# Patient Record
Sex: MALE | Race: ASIAN | NOT HISPANIC OR LATINO | URBAN - METROPOLITAN AREA
[De-identification: names, ages, dates, MRNs, and addresses within clinical notes are randomized per-mention and may not be internally consistent; named-entity substitution may affect disease eponyms.]

---

## 2021-04-28 ENCOUNTER — IMMUNIZATIONS (OUTPATIENT)
Dept: FAMILY MEDICINE CLINIC | Facility: HOSPITAL | Age: 41
End: 2021-04-28

## 2021-04-28 DIAGNOSIS — Z23 ENCOUNTER FOR IMMUNIZATION: Primary | ICD-10-CM

## 2021-04-28 PROCEDURE — 0001A SARS-COV-2 / COVID-19 MRNA VACCINE (PFIZER-BIONTECH) 30 MCG: CPT

## 2021-04-28 PROCEDURE — 91300 SARS-COV-2 / COVID-19 MRNA VACCINE (PFIZER-BIONTECH) 30 MCG: CPT

## 2021-05-19 ENCOUNTER — IMMUNIZATIONS (OUTPATIENT)
Dept: FAMILY MEDICINE CLINIC | Facility: HOSPITAL | Age: 41
End: 2021-05-19

## 2021-05-19 DIAGNOSIS — Z23 ENCOUNTER FOR IMMUNIZATION: Primary | ICD-10-CM

## 2021-05-19 PROCEDURE — 0002A SARS-COV-2 / COVID-19 MRNA VACCINE (PFIZER-BIONTECH) 30 MCG: CPT

## 2021-05-19 PROCEDURE — 91300 SARS-COV-2 / COVID-19 MRNA VACCINE (PFIZER-BIONTECH) 30 MCG: CPT

## 2024-08-02 ENCOUNTER — HOSPITAL ENCOUNTER (EMERGENCY)
Dept: VASCULAR ULTRASOUND | Facility: HOSPITAL | Age: 44
Discharge: HOME | End: 2024-08-02

## 2024-08-02 ENCOUNTER — HOSPITAL ENCOUNTER (EMERGENCY)
Facility: HOSPITAL | Age: 44
Discharge: HOME | End: 2024-08-02
Attending: EMERGENCY MEDICINE

## 2024-08-02 VITALS
DIASTOLIC BLOOD PRESSURE: 80 MMHG | SYSTOLIC BLOOD PRESSURE: 126 MMHG | HEART RATE: 85 BPM | RESPIRATION RATE: 18 BRPM | OXYGEN SATURATION: 96 % | TEMPERATURE: 98.6 F

## 2024-08-02 DIAGNOSIS — R79.89 ELEVATED SERUM CREATININE: ICD-10-CM

## 2024-08-02 DIAGNOSIS — M79.604 RIGHT LEG PAIN: Primary | ICD-10-CM

## 2024-08-02 DIAGNOSIS — D64.9 ANEMIA: ICD-10-CM

## 2024-08-02 LAB
ALBUMIN SERPL BCG-MCNC: 3.9 G/DL (ref 3.5–5)
ALP SERPL-CCNC: 72 U/L (ref 34–104)
ALT SERPL W P-5'-P-CCNC: 18 U/L (ref 7–52)
ANION GAP SERPL CALCULATED.3IONS-SCNC: 15 MMOL/L (ref 4–13)
AST SERPL W P-5'-P-CCNC: 17 U/L (ref 13–39)
BASOPHILS # BLD AUTO: 0.04 THOUSANDS/ÂΜL (ref 0–0.1)
BASOPHILS NFR BLD AUTO: 0 % (ref 0–1)
BILIRUB SERPL-MCNC: 0.36 MG/DL (ref 0.2–1)
BUN SERPL-MCNC: 59 MG/DL (ref 5–25)
CALCIUM SERPL-MCNC: 9.4 MG/DL (ref 8.4–10.2)
CHLORIDE SERPL-SCNC: 95 MMOL/L (ref 96–108)
CO2 SERPL-SCNC: 24 MMOL/L (ref 21–32)
CREAT SERPL-MCNC: 14.91 MG/DL (ref 0.6–1.3)
EOSINOPHIL # BLD AUTO: 0.31 THOUSAND/ÂΜL (ref 0–0.61)
EOSINOPHIL NFR BLD AUTO: 3 % (ref 0–6)
ERYTHROCYTE [DISTWIDTH] IN BLOOD BY AUTOMATED COUNT: 16.7 % (ref 11.6–15.1)
GFR SERPL CREATININE-BSD FRML MDRD: 3 ML/MIN/1.73SQ M
GLUCOSE SERPL-MCNC: 117 MG/DL (ref 65–140)
HCT VFR BLD AUTO: 34.6 % (ref 36.5–49.3)
HGB BLD-MCNC: 10.2 G/DL (ref 12–17)
IMM GRANULOCYTES # BLD AUTO: 0.07 THOUSAND/UL (ref 0–0.2)
IMM GRANULOCYTES NFR BLD AUTO: 1 % (ref 0–2)
LYMPHOCYTES # BLD AUTO: 1.97 THOUSANDS/ÂΜL (ref 0.6–4.47)
LYMPHOCYTES NFR BLD AUTO: 18 % (ref 14–44)
MCH RBC QN AUTO: 29.1 PG (ref 26.8–34.3)
MCHC RBC AUTO-ENTMCNC: 29.5 G/DL (ref 31.4–37.4)
MCV RBC AUTO: 99 FL (ref 82–98)
MONOCYTES # BLD AUTO: 1.16 THOUSAND/ÂΜL (ref 0.17–1.22)
MONOCYTES NFR BLD AUTO: 11 % (ref 4–12)
NEUTROPHILS # BLD AUTO: 7.49 THOUSANDS/ÂΜL (ref 1.85–7.62)
NEUTS SEG NFR BLD AUTO: 67 % (ref 43–75)
NRBC BLD AUTO-RTO: 0 /100 WBCS
PLATELET # BLD AUTO: 165 THOUSANDS/UL (ref 149–390)
PMV BLD AUTO: 13.7 FL (ref 8.9–12.7)
POTASSIUM SERPL-SCNC: 5.3 MMOL/L (ref 3.5–5.3)
PROT SERPL-MCNC: 8.1 G/DL (ref 6.4–8.4)
RBC # BLD AUTO: 3.51 MILLION/UL (ref 3.88–5.62)
SODIUM SERPL-SCNC: 134 MMOL/L (ref 135–147)
WBC # BLD AUTO: 11.04 THOUSAND/UL (ref 4.31–10.16)

## 2024-08-02 PROCEDURE — 99284 EMERGENCY DEPT VISIT MOD MDM: CPT | Performed by: EMERGENCY MEDICINE

## 2024-08-02 PROCEDURE — 80053 COMPREHEN METABOLIC PANEL: CPT

## 2024-08-02 PROCEDURE — 93971 EXTREMITY STUDY: CPT

## 2024-08-02 PROCEDURE — 85025 COMPLETE CBC W/AUTO DIFF WBC: CPT

## 2024-08-02 PROCEDURE — 99284 EMERGENCY DEPT VISIT MOD MDM: CPT

## 2024-08-02 PROCEDURE — 36415 COLL VENOUS BLD VENIPUNCTURE: CPT

## 2024-08-02 PROCEDURE — 93971 EXTREMITY STUDY: CPT | Performed by: SURGERY

## 2024-08-02 RX ORDER — LIDOCAINE 50 MG/G
1 PATCH TOPICAL ONCE
Status: DISCONTINUED | OUTPATIENT
Start: 2024-08-02 | End: 2024-08-02 | Stop reason: HOSPADM

## 2024-08-02 RX ADMIN — LIDOCAINE 5% 1 PATCH: 700 PATCH TOPICAL at 17:33

## 2024-08-02 NOTE — ED ATTENDING ATTESTATION
8/2/2024  I, Babak Pabon MD, saw and evaluated the patient. I have discussed the patient with the resident/non-physician practitioner and agree with the resident's/non-physician practitioner's findings, Plan of Care, and MDM as documented in the resident's/non-physician practitioner's note, except where noted. All available labs and Radiology studies were reviewed.  I was present for key portions of any procedure(s) performed by the resident/non-physician practitioner and I was immediately available to provide assistance.       At this point I agree with the current assessment done in the Emergency Department.  I have conducted an independent evaluation of this patient a history and physical is as follows:    History    Patient is a 44-year-old male, with a history significant for hypertension per my review of the medical record as well as end-stage renal disease/oliguric on peritoneal dialysis, who presents to the ED today for evaluation of a 1 week history of gradual onset, atraumatic, constant, waxing waning course right calf pain.  Patient denies history of similar symptoms, history of VTE, fever, new weakness or numbness, chest pain, dyspnea, abdominal pain, fever, dysuria or polyuria.  Patient took Tylenol, up to 2 pills daily, to remit his symptoms.  Touch/ambulation exacerbate his discomfort.  Patient's wife, present in room and providing collateral history, states that it is a sharp sensation.  There is associated overlying discoloration.    Patient is without other concerns at this time.     ROS  Patient denies: Fever; dysphagia; vision change; chest pain; dyspnea; abdominal pain; polyuria; dysuria;  weakness; numbness; difficulty walking; confusion    Physical Exam    GENERAL APPEARANCE: NAD  NEURO: Patient is speaking clearly in complete sentences.  Patient is answering appropriately and able follow commands.  Patient is moving all four extremities spontaneously.  No facial droop.  Tongue  midline.  HEENT: PERRL, Moist mucous membranes, external ears normal, nose normal  Neck: No cervical adenopathy  CV: RRR. No murmurs, rubs, gallops.  2+ DP pulses bilaterally  LUNGS: Clear to auscultation: No wheezes, stridor, rhonchi, rales  GI: Abdomen non-distended. Soft. Non-tender and without rebound or guarding   : Deferred at this time  MSK: No deformity.  Slight swelling of the right calf when compared to the left.  Tenderness to palpation over the saphenous distribution.  Soft compartments/no pain out of proportion to exam/no abnormal warmth/no crepitus.  Small area of overlying dark discoloration, narrow, over the saphenous area.  Skin: Warm and dry.  No overlying skin defect of the right lower extremity  Capillary refill: <2 seconds    Patient is currently afebrile and hemodynamically stable    Assessment/Plan/MDM  Right leg pain  -This presentation is concerning for: DVT versus superficial thrombophlebitis.  Sprain, strain also considered.  No reason to suspect NSTI, fracture, compartment syndrome, cellulitis, acute limb ischemia based upon history physical exam  -Will investigate with CBC, CMP, lower extremity duplex  -Will manage with analgesia and further based on workup    ED Course  ED Course as of 08/02/24 1819   Fri Aug 02, 2024   1638 Hemoglobin(!): 10.2  Low, no prior to compare   1702 Creatinine(!): 14.91  High, patient has ESRD         Critical Care Time  Procedures

## 2024-08-02 NOTE — ED PROVIDER NOTES
History  Chief Complaint   Patient presents with    Leg Pain     Pt reports right leg pain starting last week. Denies injury     44 y.o. M with hx HTN, T2DM w/ nephropathy and neuropathy on dialysis presenting for right calf pain. Pt reports pain started spontaneously over the past week without trauma or known bug exposure. Pain is worse with ambulation and improves with rest. Taking tylenol without benefit. Reports cough that is improving over the past week but no other cold like symptoms. No recent travel. No hx or fhx of clotting issues. No fever, chest pain, SOB, abdominal pain, or other acute symptoms.        Leg Pain  Associated symptoms: no back pain and no fever        None       Past Medical History:   Diagnosis Date    Hypertension     Renal disorder        No past surgical history on file.    No family history on file.  I have reviewed and agree with the history as documented.    E-Cigarette/Vaping     E-Cigarette/Vaping Substances     Social History     Tobacco Use    Smoking status: Never    Smokeless tobacco: Never        Review of Systems   Constitutional:  Negative for chills and fever.   Eyes:  Negative for pain and visual disturbance.   Respiratory:  Positive for cough. Negative for shortness of breath.    Cardiovascular:  Negative for chest pain and palpitations.   Gastrointestinal:  Negative for abdominal pain and vomiting.   Genitourinary:  Negative for dysuria and hematuria.   Musculoskeletal:  Positive for myalgias. Negative for arthralgias and back pain.   Skin:  Positive for color change. Negative for rash.   All other systems reviewed and are negative.      Physical Exam  ED Triage Vitals [08/02/24 1543]   Temperature Pulse Respirations Blood Pressure SpO2   98.6 °F (37 °C) 85 18 126/80 96 %      Temp Source Heart Rate Source Patient Position - Orthostatic VS BP Location FiO2 (%)   Oral Monitor -- Right arm --      Pain Score       --             Orthostatic Vital Signs  Vitals:    08/02/24  1543   BP: 126/80   Pulse: 85       Physical Exam  Vitals and nursing note reviewed.   Constitutional:       General: He is not in acute distress.     Appearance: He is well-developed.   HENT:      Head: Normocephalic and atraumatic.   Eyes:      Conjunctiva/sclera: Conjunctivae normal.   Cardiovascular:      Rate and Rhythm: Normal rate and regular rhythm.      Pulses: Normal pulses.      Heart sounds: No murmur heard.  Pulmonary:      Effort: Pulmonary effort is normal. No respiratory distress.      Breath sounds: Normal breath sounds.   Abdominal:      Palpations: Abdomen is soft.      Tenderness: There is no abdominal tenderness.   Musculoskeletal:         General: Swelling and tenderness present.      Cervical back: Neck supple.   Skin:     General: Skin is warm and dry.      Capillary Refill: Capillary refill takes less than 2 seconds.      Comments: Swelling along the medial aspect of the calf tracking towards the ankle.  Overlying erythema.  Mild tenderness palpation over the medial aspect of the tibia.  Vascular intact distally.   Neurological:      Mental Status: He is alert.   Psychiatric:         Mood and Affect: Mood normal.         ED Medications  Medications   lidocaine (LIDODERM) 5 % patch 1 patch (1 patch Topical Medication Applied 8/2/24 6312)       Diagnostic Studies  Results Reviewed       Procedure Component Value Units Date/Time    Comprehensive metabolic panel [[id number]]  (Abnormal) Collected: 08/02/24 1628    Lab Status: Final result Specimen: Blood from Arm, Left Updated: 08/02/24 1654     Sodium 134 mmol/L      Potassium 5.3 mmol/L      Chloride 95 mmol/L      CO2 24 mmol/L      ANION GAP 15 mmol/L      BUN 59 mg/dL      Creatinine 14.91 mg/dL      Glucose 117 mg/dL      Calcium 9.4 mg/dL      AST 17 U/L      ALT 18 U/L      Alkaline Phosphatase 72 U/L      Total Protein 8.1 g/dL      Albumin 3.9 g/dL      Total Bilirubin 0.36 mg/dL      eGFR 3 ml/min/1.73sq m     Narrative:      National  Kid[employer] Disease [employer] guidelines for Chronic Kidney Disease (CKD):     Stage 1 with normal or high GFR (GFR > 90 mL/min/1.73 square meters)    Stage 2 Mild CKD (GFR = 60-89 mL/min/1.73 square meters)    Stage 3A Moderate CKD (GFR = 45-59 mL/min/1.73 square meters)    Stage 3B Moderate CKD (GFR = 30-44 mL/min/1.73 square meters)    Stage 4 Severe CKD (GFR = 15-29 mL/min/1.73 square meters)    Stage 5 End Stage CKD (GFR <15 mL/min/1.73 square meters)  Note: GFR calculation is accurate only with a steady state creatinine    CBC and differential [[id number]]  (Abnormal) Collected: 08/02/24 1628    Lab Status: Final result Specimen: Blood from Arm, Left Updated: 08/02/24 1637     WBC 11.04 Thousand/uL      RBC 3.51 Million/uL      Hemoglobin 10.2 g/dL      Hematocrit 34.6 %      MCV 99 fL      MCH 29.1 pg      MCHC 29.5 g/dL      RDW 16.7 %      MPV 13.7 fL      Platelets 165 Thousands/uL      nRBC 0 /100 WBCs      Segmented % 67 %      Immature Grans % 1 %      Lymphocytes % 18 %      Monocytes % 11 %      Eosinophils Relative 3 %      Basophils Relative 0 %      Absolute Neutrophils 7.49 Thousands/µL      Absolute Immature Grans 0.07 Thousand/uL      Absolute Lymphocytes 1.97 Thousands/µL      Absolute Monocytes 1.16 Thousand/µL      Eosinophils Absolute 0.31 Thousand/µL      Basophils Absolute 0.04 Thousands/µL                    VAS lower limb venous duplex study, unilateral/limited    (Results Pending)         Procedures  Procedures      ED Course  ED Course as of 08/02/24 1743   Fri Aug 02, 2024   1610 44 y.o. M with hx HTN, T2DM w/ nephropathy and neuropathy on dialysis presenting for right calf pain. Pt reports pain started spontaneously over the past week without trauma or known bug exposure. Pain is worse with ambulation and improves with rest. Taking tylenol without benefit. Reports cough that is improving over the past week but no other cold like symptoms. No recent travel. No hx or fhx of clotting issues.  No fever, chest pain, SOB, abdominal pain, or other acute symptoms.   1648 Differential at this point include superficial thrombophlebitis, DVT, calf strain, peripheral vascular disease.   1656 CBC, CMP notable for elevated BUN/creatinine. White blood cell count 11.04, hemoglobin 10.2.  No left shift.  No previous labs available for comparison.   1727 Duplex ultrasound with no evidence of DVT or superficial vein thrombosis.  Low suspicion for other dangerous causes including cellulitis, compartment syndrome.   1728 Plan to discharge home with lidocaine patch.  Follow-up with PCP if symptoms not improved.                                       Medical Decision Making  See ED course    Amount and/or Complexity of Data Reviewed  Labs: ordered.    Risk  Prescription drug management.          Disposition  Final diagnoses:   Right leg pain     Time reflects when diagnosis was documented in both MDM as applicable and the Disposition within this note       Time User Action Codes Description Comment    8/2/2024  5:29 PM Len Rodriguez Add [M79.604] Right leg pain           ED Disposition       ED Disposition   Discharge    Condition   Stable    Date/Time   Fri Aug 2, 2024  5:29 PM    Comment   Maeve Griffin discharge to home/self care.                   Follow-up Information       Follow up With Specialties Details Why Contact Info    Your PCP                Patient's Medications    No medications on file     No discharge procedures on file.    PDMP Review       None             ED Provider  Attending physically available and evaluated Maeve Griffin. I managed the patient along with the ED Attending.    Electronically Signed by           Len Rodriguez MD  08/02/24 9056